# Patient Record
Sex: MALE | Race: ASIAN | NOT HISPANIC OR LATINO
[De-identification: names, ages, dates, MRNs, and addresses within clinical notes are randomized per-mention and may not be internally consistent; named-entity substitution may affect disease eponyms.]

---

## 2020-08-13 ENCOUNTER — APPOINTMENT (OUTPATIENT)
Dept: CARDIOLOGY | Facility: CLINIC | Age: 80
End: 2020-08-13
Payer: MEDICARE

## 2020-08-13 VITALS
BODY MASS INDEX: 19.36 KG/M2 | DIASTOLIC BLOOD PRESSURE: 78 MMHG | WEIGHT: 112 LBS | SYSTOLIC BLOOD PRESSURE: 125 MMHG | HEART RATE: 110 BPM | HEIGHT: 63.78 IN | RESPIRATION RATE: 17 BRPM | TEMPERATURE: 98.2 F | OXYGEN SATURATION: 96 %

## 2020-08-13 DIAGNOSIS — K21.9 GASTRO-ESOPHAGEAL REFLUX DISEASE W/OUT ESOPHAGITIS: ICD-10-CM

## 2020-08-13 DIAGNOSIS — E78.5 HYPERLIPIDEMIA, UNSPECIFIED: ICD-10-CM

## 2020-08-13 DIAGNOSIS — R00.2 PALPITATIONS: ICD-10-CM

## 2020-08-13 DIAGNOSIS — Z87.891 PERSONAL HISTORY OF NICOTINE DEPENDENCE: ICD-10-CM

## 2020-08-13 DIAGNOSIS — J43.9 EMPHYSEMA, UNSPECIFIED: ICD-10-CM

## 2020-08-13 DIAGNOSIS — N40.0 BENIGN PROSTATIC HYPERPLASIA WITHOUT LOWER URINARY TRACT SYMPMS: ICD-10-CM

## 2020-08-13 DIAGNOSIS — R07.9 CHEST PAIN, UNSPECIFIED: ICD-10-CM

## 2020-08-13 PROBLEM — Z00.00 ENCOUNTER FOR PREVENTIVE HEALTH EXAMINATION: Status: ACTIVE | Noted: 2020-08-13

## 2020-08-13 PROCEDURE — 93015 CV STRESS TEST SUPVJ I&R: CPT

## 2020-08-13 PROCEDURE — 93306 TTE W/DOPPLER COMPLETE: CPT

## 2020-08-13 PROCEDURE — 99204 OFFICE O/P NEW MOD 45 MIN: CPT | Mod: 25

## 2020-08-19 ENCOUNTER — NON-APPOINTMENT (OUTPATIENT)
Age: 80
End: 2020-08-19

## 2020-08-20 ENCOUNTER — APPOINTMENT (OUTPATIENT)
Dept: CARDIOLOGY | Facility: CLINIC | Age: 80
End: 2020-08-20
Payer: MEDICARE

## 2020-08-20 VITALS
RESPIRATION RATE: 18 BRPM | WEIGHT: 115 LBS | TEMPERATURE: 97.9 F | HEART RATE: 68 BPM | OXYGEN SATURATION: 96 % | DIASTOLIC BLOOD PRESSURE: 72 MMHG | BODY MASS INDEX: 19.88 KG/M2 | SYSTOLIC BLOOD PRESSURE: 122 MMHG

## 2020-08-20 PROCEDURE — 99213 OFFICE O/P EST LOW 20 MIN: CPT

## 2020-08-24 PROBLEM — Z87.891 FORMER SMOKER, STOPPED SMOKING IN DISTANT PAST: Status: ACTIVE | Noted: 2020-08-24

## 2020-08-28 PROBLEM — J43.9 EMPHYSEMA/COPD: Status: ACTIVE | Noted: 2020-08-28

## 2020-08-28 PROBLEM — K21.9 ESOPHAGEAL REFLUX: Status: ACTIVE | Noted: 2020-08-28

## 2020-08-28 PROBLEM — N40.0 BPH (BENIGN PROSTATIC HYPERPLASIA): Status: ACTIVE | Noted: 2020-08-28

## 2020-08-28 PROBLEM — E78.5 HLD (HYPERLIPIDEMIA): Status: ACTIVE | Noted: 2020-08-28

## 2020-08-28 NOTE — PHYSICAL EXAM
[General Appearance - Well Developed] : well developed [Normal Appearance] : normal appearance [Well Groomed] : well groomed [General Appearance - Well Nourished] : well nourished [Normal Conjunctiva] : the conjunctiva exhibited no abnormalities [No Deformities] : no deformities [General Appearance - In No Acute Distress] : no acute distress [Normal Oral Mucosa] : normal oral mucosa [Eyelids - No Xanthelasma] : the eyelids demonstrated no xanthelasmas [Normal Jugular Venous V Waves Present] : normal jugular venous V waves present [No Oral Pallor] : no oral pallor [Normal Jugular Venous A Waves Present] : normal jugular venous A waves present [No Oral Cyanosis] : no oral cyanosis [Heart Rate And Rhythm] : heart rate and rhythm were normal [No Jugular Venous Cuellar A Waves] : no jugular venous cuellar A waves [Murmurs] : no murmurs present [Heart Sounds] : normal S1 and S2 [Exaggerated Use Of Accessory Muscles For Inspiration] : no accessory muscle use [Respiration, Rhythm And Depth] : normal respiratory rhythm and effort [Abdomen Soft] : soft [Auscultation Breath Sounds / Voice Sounds] : lungs were clear to auscultation bilaterally [Abdomen Tenderness] : non-tender [Abdomen Mass (___ Cm)] : no abdominal mass palpated [Abnormal Walk] : normal gait [Gait - Sufficient For Exercise Testing] : the gait was sufficient for exercise testing [Cyanosis, Localized] : no localized cyanosis [] : no ischemic changes [Nail Clubbing] : no clubbing of the fingernails [Petechial Hemorrhages (___cm)] : no petechial hemorrhages [Affect] : the affect was normal [Oriented To Time, Place, And Person] : oriented to person, place, and time [No Anxiety] : not feeling anxious [Mood] : the mood was normal [Arterial Pulses Normal] : the arterial pulses were normal [Edema] : no peripheral edema present

## 2020-08-28 NOTE — HISTORY OF PRESENT ILLNESS
[FreeTextEntry1] : 80 year-old male wit HLD, BPH, COPD presents for evaluation of CP.  Patient reports lower CP and palpitations for past week, not related to exertion, lasting 30 minutes.  Patient denies SOB.   Patient denies h/o syncope.  Patient reports occasional lightheadedness lasting seconds.   I advised patient to undergo an echocardiogram and a treadmill stress test.  PAF was noted during stress testing.  I advised patient to start Eliquis 2.5 mg BID, Metoprolol ER 25 mg, and Multaq 400 mg BID.  Followup 1 week.

## 2020-09-11 ENCOUNTER — APPOINTMENT (OUTPATIENT)
Dept: CARDIOLOGY | Facility: CLINIC | Age: 80
End: 2020-09-11
Payer: MEDICARE

## 2020-09-11 VITALS
HEART RATE: 69 BPM | OXYGEN SATURATION: 93 % | DIASTOLIC BLOOD PRESSURE: 72 MMHG | RESPIRATION RATE: 18 BRPM | TEMPERATURE: 97.8 F | WEIGHT: 112 LBS | SYSTOLIC BLOOD PRESSURE: 135 MMHG | BODY MASS INDEX: 19.36 KG/M2

## 2020-09-11 PROCEDURE — 99213 OFFICE O/P EST LOW 20 MIN: CPT

## 2020-09-11 RX ORDER — ASPIRIN ENTERIC COATED TABLETS 81 MG 81 MG/1
81 TABLET, DELAYED RELEASE ORAL
Refills: 0 | Status: DISCONTINUED | COMMUNITY
End: 2020-09-11

## 2020-09-11 RX ORDER — OMEPRAZOLE 20 MG/1
20 CAPSULE, DELAYED RELEASE ORAL
Qty: 30 | Refills: 1 | Status: ACTIVE | COMMUNITY
Start: 2020-09-11 | End: 1900-01-01

## 2020-09-11 RX ORDER — FAMOTIDINE 20 MG/1
20 TABLET, FILM COATED ORAL
Qty: 30 | Refills: 5 | Status: DISCONTINUED | COMMUNITY
Start: 2020-09-11 | End: 2020-09-11

## 2020-11-14 NOTE — PHYSICAL EXAM
[General Appearance - Well Developed] : well developed [Normal Appearance] : normal appearance [Well Groomed] : well groomed [General Appearance - Well Nourished] : well nourished [No Deformities] : no deformities [General Appearance - In No Acute Distress] : no acute distress [Normal Conjunctiva] : the conjunctiva exhibited no abnormalities [Eyelids - No Xanthelasma] : the eyelids demonstrated no xanthelasmas [Normal Oral Mucosa] : normal oral mucosa [No Oral Pallor] : no oral pallor [No Oral Cyanosis] : no oral cyanosis [Normal Jugular Venous A Waves Present] : normal jugular venous A waves present [Normal Jugular Venous V Waves Present] : normal jugular venous V waves present [No Jugular Venous Cuellar A Waves] : no jugular venous cuellar A waves [Respiration, Rhythm And Depth] : normal respiratory rhythm and effort [Exaggerated Use Of Accessory Muscles For Inspiration] : no accessory muscle use [Auscultation Breath Sounds / Voice Sounds] : lungs were clear to auscultation bilaterally [Heart Rate And Rhythm] : heart rate and rhythm were normal [Heart Sounds] : normal S1 and S2 [Murmurs] : no murmurs present [Abdomen Soft] : soft [Abdomen Tenderness] : non-tender [Abdomen Mass (___ Cm)] : no abdominal mass palpated [Abnormal Walk] : normal gait [Gait - Sufficient For Exercise Testing] : the gait was sufficient for exercise testing [Nail Clubbing] : no clubbing of the fingernails [Cyanosis, Localized] : no localized cyanosis [Petechial Hemorrhages (___cm)] : no petechial hemorrhages [] : no ischemic changes [Oriented To Time, Place, And Person] : oriented to person, place, and time [Affect] : the affect was normal [Mood] : the mood was normal [No Anxiety] : not feeling anxious [Arterial Pulses Normal] : the arterial pulses were normal [Edema] : no peripheral edema present

## 2020-11-14 NOTE — HISTORY OF PRESENT ILLNESS
[FreeTextEntry1] : 80 year-old male with PAF, HLD, BPH, COPD presents for followup.  \par \par Patient was last seen on 8/20/20.  I advised patient to continue Eliquis 2.5 mg BID Metoprolol ER 25 mg and Multaq 400 mg BID.\par \par Patient reports stomach discomfort, burning sensation.  He is not taking stomach medications.  Patient denies CP, SOB, palpitations, or lightheadedness.  I advised patient to have a trial of Omeprazole.  Patient denies diarrhea.  I advised patient to continue on all other medications. FU in 2 months. \par

## 2020-11-14 NOTE — DISCUSSION/SUMMARY
[FreeTextEntry1] : 80 year-old male with PAF, HLD, BPH, COPD presents for followup.  \par \par Patient was last seen on 8/20/20.  I advised patient to continue Eliquis 2.5 mg BID Metoprolol ER 25 mg and Multaq 400 mg BID.\par \par Patient reports stomach discomfort, burning sensation.  He is not taking stomach medications.  Patient denies CP, SOB, palpitations, or lightheadedness.  I advised patient to have a trial of Omeprazole.  Patient denies diarrhea.  I advised patient to continue on all other medications. FU in 2 months. \par \par (1) Stomach discomfort - I advised patient to try Omeprazole 20 mg.\par \par (2) PAF - Patient has been stable.  I advised patient to continue Eliquis 2.5 mg BID Metoprolol ER 25 mg and Multaq 400 mg BID.  His [id number] score is 2 (age>75).\par \par (3) Followup - 2 months.

## 2020-11-14 NOTE — REASON FOR VISIT
[Follow-Up - Clinic] : a clinic follow-up of [Atrial Fibrillation] : atrial fibrillation [Palpitations] : palpitations [FreeTextEntry1] : .

## 2020-11-14 NOTE — PHYSICAL EXAM
[General Appearance - Well Developed] : well developed [Normal Appearance] : normal appearance [Well Groomed] : well groomed [General Appearance - Well Nourished] : well nourished [No Deformities] : no deformities [General Appearance - In No Acute Distress] : no acute distress [Normal Conjunctiva] : the conjunctiva exhibited no abnormalities [Eyelids - No Xanthelasma] : the eyelids demonstrated no xanthelasmas [Normal Oral Mucosa] : normal oral mucosa [No Oral Pallor] : no oral pallor [No Oral Cyanosis] : no oral cyanosis [Normal Jugular Venous A Waves Present] : normal jugular venous A waves present [Normal Jugular Venous V Waves Present] : normal jugular venous V waves present [No Jugular Venous Cuellar A Waves] : no jugular venous cuellar A waves [Respiration, Rhythm And Depth] : normal respiratory rhythm and effort [Exaggerated Use Of Accessory Muscles For Inspiration] : no accessory muscle use [Auscultation Breath Sounds / Voice Sounds] : lungs were clear to auscultation bilaterally [Heart Rate And Rhythm] : heart rate and rhythm were normal [Murmurs] : no murmurs present [Heart Sounds] : normal S1 and S2 [Abdomen Soft] : soft [Abdomen Tenderness] : non-tender [Abdomen Mass (___ Cm)] : no abdominal mass palpated [Abnormal Walk] : normal gait [Gait - Sufficient For Exercise Testing] : the gait was sufficient for exercise testing [Nail Clubbing] : no clubbing of the fingernails [Cyanosis, Localized] : no localized cyanosis [Petechial Hemorrhages (___cm)] : no petechial hemorrhages [] : no ischemic changes [Oriented To Time, Place, And Person] : oriented to person, place, and time [Affect] : the affect was normal [Mood] : the mood was normal [No Anxiety] : not feeling anxious [Arterial Pulses Normal] : the arterial pulses were normal [Edema] : no peripheral edema present

## 2020-11-14 NOTE — PHYSICAL EXAM
[General Appearance - Well Developed] : well developed [Normal Appearance] : normal appearance [Well Groomed] : well groomed [General Appearance - Well Nourished] : well nourished [No Deformities] : no deformities [General Appearance - In No Acute Distress] : no acute distress [Normal Conjunctiva] : the conjunctiva exhibited no abnormalities [Eyelids - No Xanthelasma] : the eyelids demonstrated no xanthelasmas [Normal Oral Mucosa] : normal oral mucosa [No Oral Pallor] : no oral pallor [No Oral Cyanosis] : no oral cyanosis [Normal Jugular Venous A Waves Present] : normal jugular venous A waves present [Normal Jugular Venous V Waves Present] : normal jugular venous V waves present [No Jugular Venous Cuellar A Waves] : no jugular venous cuellar A waves [Respiration, Rhythm And Depth] : normal respiratory rhythm and effort [Exaggerated Use Of Accessory Muscles For Inspiration] : no accessory muscle use [Auscultation Breath Sounds / Voice Sounds] : lungs were clear to auscultation bilaterally [Heart Rate And Rhythm] : heart rate and rhythm were normal [Heart Sounds] : normal S1 and S2 [Murmurs] : no murmurs present [Arterial Pulses Normal] : the arterial pulses were normal [Edema] : no peripheral edema present [Abdomen Soft] : soft [Abdomen Tenderness] : non-tender [Abdomen Mass (___ Cm)] : no abdominal mass palpated [Abnormal Walk] : normal gait [Gait - Sufficient For Exercise Testing] : the gait was sufficient for exercise testing [Nail Clubbing] : no clubbing of the fingernails [Cyanosis, Localized] : no localized cyanosis [Petechial Hemorrhages (___cm)] : no petechial hemorrhages [] : no ischemic changes [Oriented To Time, Place, And Person] : oriented to person, place, and time [Affect] : the affect was normal [Mood] : the mood was normal [No Anxiety] : not feeling anxious

## 2020-11-14 NOTE — HISTORY OF PRESENT ILLNESS
[FreeTextEntry1] : 80 year-old male wit PAF, HLD, BPH, COPD presents for followup.  \par \par Patient was last seen on 8/13/20 for evaluation of CP.   Patient underwent an echocardiogram and it showed normal LV function without significant valvular pathology. Patient underwent a treadmill stress test and completed 4 minutes of Ruperto protocol. There was no ECG evidence of ischemia.  Patient developed rapid AFIB during the study.  I advised patient to start Eliquis 2.5 mg BID, Metoprolol ER 25 mg, and Multaq 400 mg BID\par \par Patient reports feeling better on medications. He has occasional CP.  Patient denies SOB. Patient denies palpitations.  He denies bleeding issues. FU in one month.

## 2020-11-16 ENCOUNTER — APPOINTMENT (OUTPATIENT)
Dept: CARDIOLOGY | Facility: CLINIC | Age: 80
End: 2020-11-16
Payer: MEDICARE

## 2020-11-16 VITALS
DIASTOLIC BLOOD PRESSURE: 82 MMHG | TEMPERATURE: 98 F | RESPIRATION RATE: 18 BRPM | BODY MASS INDEX: 19.53 KG/M2 | HEART RATE: 110 BPM | WEIGHT: 113 LBS | OXYGEN SATURATION: 95 % | SYSTOLIC BLOOD PRESSURE: 145 MMHG

## 2020-11-16 DIAGNOSIS — R10.13 EPIGASTRIC PAIN: ICD-10-CM

## 2020-11-16 PROCEDURE — 99213 OFFICE O/P EST LOW 20 MIN: CPT

## 2020-11-16 PROCEDURE — 99072 ADDL SUPL MATRL&STAF TM PHE: CPT

## 2020-11-24 NOTE — DISCUSSION/SUMMARY
[FreeTextEntry1] : 80 year-old male with PAF, HLD, BPH, COPD presents for followup.  \par \par Patient was last seen on 8/20/20.  I advised patient to continue Eliquis 2.5 mg BID Metoprolol ER 25 mg and Multaq 400 mg BID.\par \par Patient reports stomach discomfort, burning sensation.  He is not taking stomach medications.  Patient denies CP, SOB, palpitations, or lightheadedness.  I advised patient to have a trial of Omeprazole.  Patient denies diarrhea.  I advised patient to continue on all other medications. FU in 2 months. \par \par (1) Stomach discomfort - I advised patient to try Omeprazole 20 mg.\par \par (2) PAF - Patient has been stable.  I advised patient to continue Eliquis 2.5 mg BID Metoprolol ER 25 mg and Multaq 400 mg BID.  His [id number] score is 2 (age>75).\par \par (3) Followup - 2 months.

## 2020-11-24 NOTE — HISTORY OF PRESENT ILLNESS
[FreeTextEntry1] : 80 year-old male with PAF, HLD, BPH, COPD presents for followup.  \par \par Patient was last seen on 9/11/20.  He reported stomach discomfort.  I advised patient to try Omeprazole 20 mg.  I advised patient to continue Eliquis 2.5 mg BID Metoprolol ER 25 mg and Multaq 400 mg BID.

## 2020-11-24 NOTE — REASON FOR VISIT
[Follow-Up - Clinic] : a clinic follow-up of [Atrial Fibrillation] : atrial fibrillation [FreeTextEntry1] : 11/16/20- Patient reports epigastric discomfort. His HR is elevated because he ran out of Metoprolol ER 25 mg for one week. He reports stomach medicine had helped in the past and he will take the stomach medicine again. I advised patient to restart on Metoprolol right away and to call anytime he is out of medications. \par \par \par \par 9/11/20 - Patient reports stomach discomfort, burning sensation.  He is not taking stomach medications.  Patient denies CP, SOB, palpitations, or lightheadedness.  I advised patient to have a trial of Omeprazole.  Patient denies diarrhea.  I advised patient to continue on all other medications. FU in 2 months. \par

## 2021-02-13 NOTE — PHYSICAL EXAM
[General Appearance - Well Developed] : well developed [Normal Appearance] : normal appearance [Well Groomed] : well groomed [General Appearance - Well Nourished] : well nourished [No Deformities] : no deformities [General Appearance - In No Acute Distress] : no acute distress [Normal Conjunctiva] : the conjunctiva exhibited no abnormalities [Eyelids - No Xanthelasma] : the eyelids demonstrated no xanthelasmas [Normal Oral Mucosa] : normal oral mucosa [No Oral Pallor] : no oral pallor [No Oral Cyanosis] : no oral cyanosis [Normal Jugular Venous A Waves Present] : normal jugular venous A waves present [Normal Jugular Venous V Waves Present] : normal jugular venous V waves present [No Jugular Venous Cuellar A Waves] : no jugular venous cuellar A waves [Respiration, Rhythm And Depth] : normal respiratory rhythm and effort [Auscultation Breath Sounds / Voice Sounds] : lungs were clear to auscultation bilaterally [Exaggerated Use Of Accessory Muscles For Inspiration] : no accessory muscle use [Heart Rate And Rhythm] : heart rate and rhythm were normal [Heart Sounds] : normal S1 and S2 [Murmurs] : no murmurs present [Edema] : no peripheral edema present [Arterial Pulses Normal] : the arterial pulses were normal [Abdomen Soft] : soft [Abdomen Tenderness] : non-tender [Abdomen Mass (___ Cm)] : no abdominal mass palpated [Abnormal Walk] : normal gait [Gait - Sufficient For Exercise Testing] : the gait was sufficient for exercise testing [Nail Clubbing] : no clubbing of the fingernails [Cyanosis, Localized] : no localized cyanosis [] : no ischemic changes [Petechial Hemorrhages (___cm)] : no petechial hemorrhages [Oriented To Time, Place, And Person] : oriented to person, place, and time [Mood] : the mood was normal [Affect] : the affect was normal [No Anxiety] : not feeling anxious

## 2021-02-16 ENCOUNTER — NON-APPOINTMENT (OUTPATIENT)
Age: 81
End: 2021-02-16

## 2021-02-16 ENCOUNTER — APPOINTMENT (OUTPATIENT)
Dept: CARDIOLOGY | Facility: CLINIC | Age: 81
End: 2021-02-16
Payer: MEDICARE

## 2021-02-16 VITALS
WEIGHT: 108 LBS | HEART RATE: 80 BPM | DIASTOLIC BLOOD PRESSURE: 73 MMHG | BODY MASS INDEX: 18.67 KG/M2 | SYSTOLIC BLOOD PRESSURE: 121 MMHG | TEMPERATURE: 97.5 F | OXYGEN SATURATION: 94 % | RESPIRATION RATE: 18 BRPM

## 2021-02-16 PROCEDURE — 99072 ADDL SUPL MATRL&STAF TM PHE: CPT

## 2021-02-16 PROCEDURE — 99213 OFFICE O/P EST LOW 20 MIN: CPT

## 2021-03-12 NOTE — HISTORY OF PRESENT ILLNESS
[FreeTextEntry1] : 80 year-old male with PAF, HLD, BPH, COPD presents for followup.  \par \par Patient was last seen on 11/16/20 for fast HR.  I advised patient to resume Metoprolol ER 25 mg. I advised patient to continue Eliquis 2.5 mg BID and Multaq 400 mg BID.\par \par Last echo was on 8/13/20 and it showed normal LV function with moderate TR.

## 2021-03-12 NOTE — DISCUSSION/SUMMARY
[FreeTextEntry1] : 80 year-old male with PAF, HLD, BPH, COPD presents for followup.  \par \par Patient was last seen on 8/20/20.  I advised patient to continue Eliquis 2.5 mg BID Metoprolol ER 25 mg and Multaq 400 mg BID.\par \par Patient reports stomach discomfort, burning sensation.  He is not taking stomach medications.  Patient denies CP, SOB, palpitations, or lightheadedness.  I advised patient to have a trial of Omeprazole.  Patient denies diarrhea.  I advised patient to continue on all other medications. FU in 2 months. \par \par (1) Stomach discomfort - I advised patient to try Omeprazole 20 mg.\par \par (2) PAF - Patient has been stable.  I advised patient to continue Eliquis 2.5 mg BID Metoprolol ER 25 mg and Multaq 400 mg BID.  His [id number] score is 2 (age>75).\par \par (3) Followup - 2 months.

## 2021-03-12 NOTE — REASON FOR VISIT
[Follow-Up - Clinic] : a clinic follow-up of [Atrial Fibrillation] : atrial fibrillation [FreeTextEntry1] : 2/16/21 - Patient reports feeling well. Patient denies CP, SOB, palpitations, or lightheadedness. He is out of medications. FU in 6 months. \par \par \par 11/16/20 - Patient reports epigastric discomfort. His HR is elevated because he ran out of Metoprolol ER 25 mg for one week. He reports stomach medicine had helped in the past and he will take the stomach medicine again. I advised patient to restart on Metoprolol right away and to call anytime he is out of medications. \par \par 9/11/20 - Patient reports stomach discomfort, burning sensation.  He is not taking stomach medications.  Patient denies CP, SOB, palpitations, or lightheadedness.  I advised patient to have a trial of Omeprazole.  Patient denies diarrhea.  I advised patient to continue on all other medications. FU in 2 months. \par

## 2021-07-09 ENCOUNTER — NON-APPOINTMENT (OUTPATIENT)
Age: 81
End: 2021-07-09

## 2021-07-21 ENCOUNTER — APPOINTMENT (OUTPATIENT)
Dept: CARDIOLOGY | Facility: CLINIC | Age: 81
End: 2021-07-21
Payer: MEDICARE

## 2021-07-21 VITALS
BODY MASS INDEX: 19.53 KG/M2 | OXYGEN SATURATION: 95 % | TEMPERATURE: 97.6 F | WEIGHT: 113 LBS | DIASTOLIC BLOOD PRESSURE: 77 MMHG | HEART RATE: 66 BPM | RESPIRATION RATE: 18 BRPM | SYSTOLIC BLOOD PRESSURE: 128 MMHG

## 2021-07-21 PROCEDURE — 99213 OFFICE O/P EST LOW 20 MIN: CPT

## 2022-01-17 NOTE — REASON FOR VISIT
[Symptom and Test Evaluation] : symptom and test evaluation [FreeTextEntry1] : 80 year-old male with PAF, HLD, BPH, COPD presents for followup.  \par \par Patient was last seen on 2/16/21.\par \par He is on Eliquis 2.5 mg BID for stroke prevention.  He is on Metoprolol ER 25 mg and Multaq 400 mg BID for AFIB suppression.\par \par Last echo was on 8/13/20 and it showed normal LV function with moderate TR.\par \par

## 2022-01-17 NOTE — HISTORY OF PRESENT ILLNESS
[FreeTextEntry1] : 7/21/21 - Patient has been stable.  Patient denies CP or SOB.  Patient reports rare palpitations.  Patient denies lightheadedness.  Patient denies h/o syncope.  FU 6 months. \par \par 2/16/21 - Patient reports feeling well. Patient denies CP, SOB, palpitations, or lightheadedness. He is out of medications. FU in 6 months. \par \par 11/16/20 - Patient reports epigastric discomfort. His HR is elevated because he ran out of Metoprolol ER 25 mg for one week. He reports stomach medicine had helped in the past and he will take the stomach medicine again. I advised patient to restart on Metoprolol right away and to call anytime he is out of medications. \par \par 9/11/20 - Patient reports stomach discomfort, burning sensation.  He is not taking stomach medications.  Patient denies CP, SOB, palpitations, or lightheadedness.  I advised patient to have a trial of Omeprazole.  Patient denies diarrhea.  I advised patient to continue on all other medications. FU in 2 months. \par

## 2022-01-19 ENCOUNTER — APPOINTMENT (OUTPATIENT)
Dept: CARDIOLOGY | Facility: CLINIC | Age: 82
End: 2022-01-19
Payer: MEDICARE

## 2022-01-25 ENCOUNTER — APPOINTMENT (OUTPATIENT)
Dept: CARDIOLOGY | Facility: CLINIC | Age: 82
End: 2022-01-25
Payer: MEDICARE

## 2022-01-25 VITALS
RESPIRATION RATE: 18 BRPM | BODY MASS INDEX: 19.7 KG/M2 | WEIGHT: 114 LBS | TEMPERATURE: 98.2 F | OXYGEN SATURATION: 95 % | HEART RATE: 67 BPM | SYSTOLIC BLOOD PRESSURE: 155 MMHG | DIASTOLIC BLOOD PRESSURE: 70 MMHG

## 2022-01-25 PROCEDURE — 99214 OFFICE O/P EST MOD 30 MIN: CPT

## 2022-02-08 ENCOUNTER — APPOINTMENT (OUTPATIENT)
Dept: CARDIOLOGY | Facility: CLINIC | Age: 82
End: 2022-02-08
Payer: MEDICARE

## 2022-02-08 VITALS — DIASTOLIC BLOOD PRESSURE: 65 MMHG | TEMPERATURE: 97.8 F | SYSTOLIC BLOOD PRESSURE: 125 MMHG

## 2022-02-08 PROCEDURE — 93306 TTE W/DOPPLER COMPLETE: CPT

## 2022-08-07 NOTE — REASON FOR VISIT
[FreeTextEntry1] : 81 year-old male with PAF, HLD, BPH, COPD presents for followup.  \par \par Patient was last seen on 7/21/21.\par \par He is on Eliquis 2.5 mg BID for stroke prevention.  He is on Metoprolol ER 25 mg and Multaq 400 mg BID for AFIB suppression.\par \par Last echo was on 8/13/20 and it showed normal LV function with moderate TR.\par \par

## 2022-08-07 NOTE — HISTORY OF PRESENT ILLNESS
[FreeTextEntry1] : 1/25/22 - Patient reports occasional PALENCIA.  Patient denies CP or SOB.  Patient reports occasional LE weakness.  Patient denies bleeding with Eliquis.  He has rales at lung bases.  I advised patient to undergo an echocardiogram.  I will obtain insurance authorization (SOB, PAF).\par \par 7/21/21 - Patient has been stable.  Patient denies CP or SOB.  Patient reports rare palpitations.  Patient denies lightheadedness.  Patient denies h/o syncope.  FU 6 months. \par \par 2/16/21 - Patient reports feeling well. Patient denies CP, SOB, palpitations, or lightheadedness. He is out of medications. FU in 6 months. \par \par 11/16/20 - Patient reports epigastric discomfort. His HR is elevated because he ran out of Metoprolol ER 25 mg for one week. He reports stomach medicine had helped in the past and he will take the stomach medicine again. I advised patient to restart on Metoprolol right away and to call anytime he is out of medications. \par \par 9/11/20 - Patient reports stomach discomfort, burning sensation.  He is not taking stomach medications.  Patient denies CP, SOB, palpitations, or lightheadedness.  I advised patient to have a trial of Omeprazole.  Patient denies diarrhea.  I advised patient to continue on all other medications. FU in 2 months. \par

## 2022-08-08 NOTE — REASON FOR VISIT
[FreeTextEntry1] : 81 year-old male with PAF, HLD, BPH, COPD presents for followup.  \par \par Patient was last seen on 1/25/22 for followup.  Patient reported occasional  PALENCIA.   I advised patient to undergo an echocardiogram.  Patient underwent an echocardiogram and it showed normal LV function without significant valvular pathology. \par \par He is on Eliquis 2.5 mg BID for stroke prevention.  He is on Metoprolol ER 25 mg and Multaq 400 mg BID for AFIB suppression.\par \par Patient underwent an echocardiogram 2/8/22 and it showed normal LV function without significant valvular pathology.

## 2022-08-09 ENCOUNTER — APPOINTMENT (OUTPATIENT)
Dept: CARDIOLOGY | Facility: CLINIC | Age: 82
End: 2022-08-09

## 2022-08-09 ENCOUNTER — NON-APPOINTMENT (OUTPATIENT)
Age: 82
End: 2022-08-09

## 2022-08-09 VITALS
SYSTOLIC BLOOD PRESSURE: 104 MMHG | DIASTOLIC BLOOD PRESSURE: 63 MMHG | OXYGEN SATURATION: 95 % | BODY MASS INDEX: 19.53 KG/M2 | HEART RATE: 74 BPM | WEIGHT: 113 LBS | RESPIRATION RATE: 18 BRPM | TEMPERATURE: 98 F

## 2022-08-09 PROCEDURE — 93000 ELECTROCARDIOGRAM COMPLETE: CPT

## 2022-08-09 PROCEDURE — 99213 OFFICE O/P EST LOW 20 MIN: CPT

## 2022-10-07 NOTE — DISCUSSION/SUMMARY
See initial evaluation in plan of care.     
[FreeTextEntry1] : 80 year-old male wit PAF, HLD, BPH, COPD presents for followup.  \par \par Patient was last seen on 8/13/20 for evaluation of CP.   Patient underwent an echocardiogram and it showed normal LV function without significant valvular pathology. Patient underwent a treadmill stress test and completed 4 minutes of Ruperto protocol. There was no ECG evidence of ischemia.  Patient developed rapid AFIB during the study.  I advised patient to start Eliquis 2.5 mg BID, Metoprolol ER 25 mg, and Multaq 400 mg BID\par \par Patient reports feeling better on medications. He has occasional CP.  Patient denies SOB. Patient denies palpitations.  He denies bleeding issues. FU in one month.\par \par (1) PAF - Patient is feeling better.  I advised patient to continue Eliquis 2.5 mg BID, Metoprolol ER 25 mg, and Multaq 400 mg BID.  His [id number] score is 2 (age>75).\par \par (2) Followup - 1 month.
Yes

## 2022-12-16 NOTE — DISCUSSION/SUMMARY
[FreeTextEntry1] : 80 year-old male wit HLD, BPH, COPD presents for evaluation of CP.  Patient reports lower CP and palpitations for past week, not related to exertion, lasting 30 minutes.  Patient denies SOB.   Patient denies h/o syncope.  Patient reports occasional lightheadedness lasting seconds.   I advised patient to undergo an echocardiogram and a treadmill stress test.  PAF was noted during stress testing.  I advised patient to start Eliquis 2.5 mg BID, Metoprolol ER 25 mg, and Multaq 400 mg BID.  Followup 1 week. \par \par (1) CP, palpitations - Patient underwent an echocardiogram and it showed normal LV function without significant valvular pathology.  Patient underwent a treadmill stress test and completed 4 minutes of Ruperto protocol.  There was no ECG evidence of ischemia. Patient appears to be at low risk for having significant CAD.  Patient developed rapid AFIB during the study.  I advised patient to start Eliquis 2.5 mg BID, Metoprolol ER 25 mg, and Multaq 400 mg BID.  His [id number] score is 2 (age>75).\par \par (2) Followup - 1 week.
Statement Selected

## 2023-02-06 NOTE — PHYSICAL EXAM
[General Appearance - Well Developed] : well developed [Normal Appearance] : normal appearance [Well Groomed] : well groomed [No Deformities] : no deformities [General Appearance - Well Nourished] : well nourished [General Appearance - In No Acute Distress] : no acute distress [Normal Conjunctiva] : the conjunctiva exhibited no abnormalities [Eyelids - No Xanthelasma] : the eyelids demonstrated no xanthelasmas [Normal Oral Mucosa] : normal oral mucosa [No Oral Pallor] : no oral pallor [No Oral Cyanosis] : no oral cyanosis [Normal Jugular Venous A Waves Present] : normal jugular venous A waves present [Normal Jugular Venous V Waves Present] : normal jugular venous V waves present [No Jugular Venous Cuellar A Waves] : no jugular venous cuellar A waves [Respiration, Rhythm And Depth] : normal respiratory rhythm and effort [Exaggerated Use Of Accessory Muscles For Inspiration] : no accessory muscle use [Auscultation Breath Sounds / Voice Sounds] : lungs were clear to auscultation bilaterally [Heart Rate And Rhythm] : heart rate and rhythm were normal [Heart Sounds] : normal S1 and S2 [Murmurs] : no murmurs present [Arterial Pulses Normal] : the arterial pulses were normal [Edema] : no peripheral edema present [Abdomen Tenderness] : non-tender [Abdomen Soft] : soft [Abdomen Mass (___ Cm)] : no abdominal mass palpated [Abnormal Walk] : normal gait [Gait - Sufficient For Exercise Testing] : the gait was sufficient for exercise testing [Nail Clubbing] : no clubbing of the fingernails [Cyanosis, Localized] : no localized cyanosis [Petechial Hemorrhages (___cm)] : no petechial hemorrhages [] : no ischemic changes [Oriented To Time, Place, And Person] : oriented to person, place, and time [Affect] : the affect was normal [Mood] : the mood was normal [No Anxiety] : not feeling anxious

## 2023-02-07 ENCOUNTER — APPOINTMENT (OUTPATIENT)
Dept: CARDIOLOGY | Facility: CLINIC | Age: 83
End: 2023-02-07
Payer: MEDICARE

## 2023-02-07 ENCOUNTER — NON-APPOINTMENT (OUTPATIENT)
Age: 83
End: 2023-02-07

## 2023-02-07 VITALS
BODY MASS INDEX: 19.01 KG/M2 | HEART RATE: 77 BPM | RESPIRATION RATE: 18 BRPM | SYSTOLIC BLOOD PRESSURE: 119 MMHG | DIASTOLIC BLOOD PRESSURE: 70 MMHG | WEIGHT: 110 LBS | TEMPERATURE: 97.3 F | OXYGEN SATURATION: 96 %

## 2023-02-07 DIAGNOSIS — R06.02 SHORTNESS OF BREATH: ICD-10-CM

## 2023-02-07 PROCEDURE — 93306 TTE W/DOPPLER COMPLETE: CPT

## 2023-02-07 PROCEDURE — 93000 ELECTROCARDIOGRAM COMPLETE: CPT

## 2023-02-07 PROCEDURE — 99214 OFFICE O/P EST MOD 30 MIN: CPT

## 2023-02-22 NOTE — HISTORY OF PRESENT ILLNESS
[FreeTextEntry1] : 2/7/23 - Pt reports regular jogging for > 1 hr. Pt reports increased PALENCIA and leg weakness. Pt denies CP and palpitations. Pt is on Lipitor 20 mg, Eliquis 2.5 mg BID, Metoprolol ER 25 mg and Multaq 400 mg BID.  patient today for followup of PAF. Patient reports mild PALENCIA. Patient denies palpitations. I advised patient to undergo an echocardiogram. FU in 6 months. \par \par 8/9/22 - Patient has been stable.  Patient denies CP.  Patient reports his breathing is OK.  Patient denies palpitations.  Patient denies lightheadedness.  Patient reports leg weakness.  ECG normal.  Rales noted at bases.  He is on Eliquis 2.5 mg BID for stroke prevention.  He is on Metoprolol ER 25 mg and Multaq 400 mg BID for AFIB suppression. I advised patient to continue current medications.  FU 6 months. \par \par 1/25/22 - Patient reports occasional PALENCIA.  Patient denies CP or SOB.  Patient reports occasional LE weakness.  Patient denies bleeding with Eliquis.  He has rales at lung bases.  I advised patient to undergo an echocardiogram.  I will obtain insurance authorization (SOB, PAF).\par \par 7/21/21 - Patient has been stable.  Patient denies CP or SOB.  Patient reports rare palpitations.  Patient denies lightheadedness.  Patient denies h/o syncope.  FU 6 months. \par \par 2/16/21 - Patient reports feeling well. Patient denies CP, SOB, palpitations, or lightheadedness. He is out of medications. FU in 6 months. \par \par 11/16/20 - Patient reports epigastric discomfort. His HR is elevated because he ran out of Metoprolol ER 25 mg for one week. He reports stomach medicine had helped in the past and he will take the stomach medicine again. I advised patient to restart on Metoprolol right away and to call anytime he is out of medications. \par \par 9/11/20 - Patient reports stomach discomfort, burning sensation.  He is not taking stomach medications.  Patient denies CP, SOB, palpitations, or lightheadedness.  I advised patient to have a trial of Omeprazole.  Patient denies diarrhea.  I advised patient to continue on all other medications. FU in 2 months. \par

## 2023-02-22 NOTE — REASON FOR VISIT
[FreeTextEntry1] : 81 year-old male with PAF since 2020, HLD, BPH, COPD presents for followup.  \par \par Patient was last seen on 1/25/22 for followup.  Patient reported occasional  PALENCIA.   I advised patient to undergo an echocardiogram.  Patient underwent an echocardiogram and it showed normal LV function without significant valvular pathology. \par \par He is on Eliquis 2.5 mg BID for stroke prevention.  He is on Metoprolol ER 25 mg and Multaq 400 mg BID for AFIB suppression.\par \par Patient underwent an echocardiogram 2/8/22 and it showed normal LV function without significant valvular pathology.

## 2023-08-08 ENCOUNTER — APPOINTMENT (OUTPATIENT)
Dept: CARDIOLOGY | Facility: CLINIC | Age: 83
End: 2023-08-08
Payer: MEDICARE

## 2023-08-08 VITALS
WEIGHT: 104 LBS | BODY MASS INDEX: 17.98 KG/M2 | SYSTOLIC BLOOD PRESSURE: 123 MMHG | DIASTOLIC BLOOD PRESSURE: 69 MMHG | HEART RATE: 55 BPM | OXYGEN SATURATION: 95 % | TEMPERATURE: 96.8 F | RESPIRATION RATE: 16 BRPM

## 2023-08-08 DIAGNOSIS — M79.10 MYALGIA, UNSPECIFIED SITE: ICD-10-CM

## 2023-08-08 PROCEDURE — 99214 OFFICE O/P EST MOD 30 MIN: CPT

## 2023-08-08 RX ORDER — FAMOTIDINE 20 MG/1
20 TABLET, FILM COATED ORAL
Qty: 30 | Refills: 5 | Status: ACTIVE | COMMUNITY
Start: 2023-08-08 | End: 1900-01-01

## 2023-08-08 RX ORDER — UBIDECARENONE 100 MG
100 CAPSULE ORAL
Qty: 30 | Refills: 5 | Status: ACTIVE | COMMUNITY
Start: 2023-08-08 | End: 1900-01-01

## 2023-08-27 NOTE — REASON FOR VISIT
[FreeTextEntry1] : 81 year-old male with PAF since 2020, HLD, BPH, COPD, presents for followup.    Patient was last seen on 2/7/23 - Pt reports regular jogging for > 1 hr.  Pt reports increased PALENCIA and leg weakness. Pt denies CP and palpitations.  Pt is on Lipitor 20 mg, Eliquis 2.5 mg BID, Metoprolol ER 25 mg and Multaq 400 mg BID.  patient today for followup of PAF. Patient reports mild PALENCIA. Patient denies palpitations. I advised patient to undergo an echocardiogram. FU in 6 months.   He is on Eliquis 2.5 mg BID for stroke prevention.  He is on Metoprolol ER 25 mg and Multaq 400 mg BID for AFIB suppression.  Patient underwent an echocardiogram 2/8/22 and it showed normal LV function without significant valvular pathology.

## 2023-08-27 NOTE — HISTORY OF PRESENT ILLNESS
[FreeTextEntry1] : 8/8/23 -  Patient reports SOB. He reports leg weakness and epigastric tenderness.   2/7/23 - Pt reports regular jogging for > 1 hr. Pt reports increased PALENCIA and leg weakness. Pt denies CP and palpitations. Pt is on Lipitor 20 mg, Eliquis 2.5 mg BID, Metoprolol ER 25 mg and Multaq 400 mg BID.  patient today for followup of PAF. Patient reports mild PALENCIA. Patient denies palpitations. I advised patient to undergo an echocardiogram. FU in 6 months.   8/9/22 - Patient has been stable.  Patient denies CP.  Patient reports his breathing is OK.  Patient denies palpitations.  Patient denies lightheadedness.  Patient reports leg weakness.  ECG normal.  Rales noted at bases.  He is on Eliquis 2.5 mg BID for stroke prevention.  He is on Metoprolol ER 25 mg and Multaq 400 mg BID for AFIB suppression. I advised patient to continue current medications.  FU 6 months.   1/25/22 - Patient reports occasional PALENCIA.  Patient denies CP or SOB.  Patient reports occasional LE weakness.  Patient denies bleeding with Eliquis.  He has rales at lung bases.  I advised patient to undergo an echocardiogram.  I will obtain insurance authorization (SOB, PAF).  7/21/21 - Patient has been stable.  Patient denies CP or SOB.  Patient reports rare palpitations.  Patient denies lightheadedness.  Patient denies h/o syncope.  FU 6 months.   2/16/21 - Patient reports feeling well. Patient denies CP, SOB, palpitations, or lightheadedness. He is out of medications. FU in 6 months.   11/16/20 - Patient reports epigastric discomfort. His HR is elevated because he ran out of Metoprolol ER 25 mg for one week. He reports stomach medicine had helped in the past and he will take the stomach medicine again. I advised patient to restart on Metoprolol right away and to call anytime he is out of medications.   9/11/20 - Patient reports stomach discomfort, burning sensation.  He is not taking stomach medications.  Patient denies CP, SOB, palpitations, or lightheadedness.  I advised patient to have a trial of Omeprazole.  Patient denies diarrhea.  I advised patient to continue on all other medications. FU in 2 months.

## 2024-01-30 ENCOUNTER — APPOINTMENT (OUTPATIENT)
Dept: CARDIOLOGY | Facility: CLINIC | Age: 84
End: 2024-01-30

## 2024-02-06 ENCOUNTER — NON-APPOINTMENT (OUTPATIENT)
Age: 84
End: 2024-02-06

## 2024-02-06 ENCOUNTER — APPOINTMENT (OUTPATIENT)
Dept: CARDIOLOGY | Facility: CLINIC | Age: 84
End: 2024-02-06
Payer: MEDICARE

## 2024-02-06 ENCOUNTER — APPOINTMENT (OUTPATIENT)
Dept: CARDIOLOGY | Facility: CLINIC | Age: 84
End: 2024-02-06

## 2024-02-06 VITALS
SYSTOLIC BLOOD PRESSURE: 115 MMHG | RESPIRATION RATE: 16 BRPM | OXYGEN SATURATION: 95 % | HEART RATE: 86 BPM | BODY MASS INDEX: 18.15 KG/M2 | TEMPERATURE: 97.8 F | DIASTOLIC BLOOD PRESSURE: 70 MMHG | WEIGHT: 105 LBS

## 2024-02-06 DIAGNOSIS — I50.810 RIGHT HEART FAILURE, UNSPECIFIED: ICD-10-CM

## 2024-02-06 DIAGNOSIS — I48.0 PAROXYSMAL ATRIAL FIBRILLATION: ICD-10-CM

## 2024-02-06 PROCEDURE — 93000 ELECTROCARDIOGRAM COMPLETE: CPT

## 2024-02-06 PROCEDURE — 99214 OFFICE O/P EST MOD 30 MIN: CPT

## 2024-02-06 RX ORDER — DRONEDARONE 400 MG/1
400 TABLET, FILM COATED ORAL
Qty: 60 | Refills: 5 | Status: ACTIVE | COMMUNITY
Start: 2020-08-13 | End: 1900-01-01

## 2024-02-06 RX ORDER — METOPROLOL SUCCINATE 25 MG/1
25 TABLET, EXTENDED RELEASE ORAL DAILY
Qty: 30 | Refills: 5 | Status: ACTIVE | COMMUNITY
Start: 2020-08-13 | End: 1900-01-01

## 2024-02-06 RX ORDER — APIXABAN 2.5 MG/1
2.5 TABLET, FILM COATED ORAL
Qty: 60 | Refills: 5 | Status: ACTIVE | COMMUNITY
Start: 2020-08-13 | End: 1900-01-01

## 2024-02-25 NOTE — HISTORY OF PRESENT ILLNESS
[FreeTextEntry1] : 2/6/24 - Patient has been stable.  Patient denies CP, SOB, palpitations, or lightheadedness.  Patient denies bleeding.  He is on Eliquis 2.5 mg BID for stroke prevention.  He is on Metoprolol ER 25 mg and Multaq 400 mg BID for AFIB suppression.  /70 HR 86.  ECG showed no ischemic changes.  I advised patient to continue current medications.  I advised patient to undergo an echocardiogram.  Patient wished to defer until the next visit.  FU 6 months.   (1) PAF - Patient has been stable. I advised patient to continue Eliquis 2.5 mg BID, Metoprolol ER 25 mg, Multaq 400 mg BID.  His [id number] score is 2 (age>75).   (2) RV/RA enlargement, moderate TR -  I advised patient to undergo an echocardiogram.  Patient wished to defer until the next visit.     (3) Followup - 6 months.   8/8/23 -  Patient reports SOB.  He reports leg weakness and epigastric tenderness.  I advised patient to continue current medications.  FU 6 months.   2/7/23 - Pt reports regular jogging for > 1 hr. Pt reports increased PALENCIA and leg weakness. Pt denies CP and palpitations. Pt is on Lipitor 20 mg, Eliquis 2.5 mg BID, Metoprolol ER 25 mg and Multaq 400 mg BID.  patient today for followup of PAF. Patient reports mild PALENCIA. Patient denies palpitations. I advised patient to undergo an echocardiogram. FU in 6 months.  Patient underwent an echocardiogram and it showed normal LV function, mild RV enlargement, moderate RA enlargement, moderate TR.   8/9/22 - Patient has been stable.  Patient denies CP.  Patient reports his breathing is OK.  Patient denies palpitations.  Patient denies lightheadedness.  Patient reports leg weakness.  ECG normal.  Rales noted at bases.  He is on Eliquis 2.5 mg BID for stroke prevention.  He is on Metoprolol ER 25 mg and Multaq 400 mg BID for AFIB suppression. I advised patient to continue current medications.  FU 6 months.   1/25/22 - Patient reports occasional PALENCIA.  Patient denies CP or SOB.  Patient reports occasional LE weakness.  Patient denies bleeding with Eliquis.  He has rales at lung bases.  I advised patient to undergo an echocardiogram.  I will obtain insurance authorization (SOB, PAF).  7/21/21 - Patient has been stable.  Patient denies CP or SOB.  Patient reports rare palpitations.  Patient denies lightheadedness.  Patient denies h/o syncope.  FU 6 months.   2/16/21 - Patient reports feeling well. Patient denies CP, SOB, palpitations, or lightheadedness. He is out of medications. FU in 6 months.   11/16/20 - Patient reports epigastric discomfort. His HR is elevated because he ran out of Metoprolol ER 25 mg for one week. He reports stomach medicine had helped in the past and he will take the stomach medicine again. I advised patient to restart on Metoprolol right away and to call anytime he is out of medications.   9/11/20 - Patient reports stomach discomfort, burning sensation.  He is not taking stomach medications.  Patient denies CP, SOB, palpitations, or lightheadedness.  I advised patient to have a trial of Omeprazole.  Patient denies diarrhea.  I advised patient to continue on all other medications. FU in 2 months.

## 2024-02-25 NOTE — REASON FOR VISIT
[FreeTextEntry1] : 83 year-old male with PAF since 2020, RV enlargement, HLD, BPH, COPD, presents for followup.    Patient was last seen on 8/8/23 - Patient reports SOB.  He reports leg weakness and epigastric tenderness.   I advised patient to continue current medications.  FU 6 months.   He is on Eliquis 2.5 mg BID for stroke prevention.  He is on Metoprolol ER 25 mg, Multaq 400 mg BID, for AFIB suppression.  Patient underwent an echocardiogram 2/7/23 and it showed normal LV function, mild RV enlargement, moderate RA enlargement, moderate TR.   Patient underwent an echocardiogram 2/8/22 and it showed normal LV function without significant valvular pathology.

## 2024-08-06 ENCOUNTER — APPOINTMENT (OUTPATIENT)
Dept: CARDIOLOGY | Facility: CLINIC | Age: 84
End: 2024-08-06

## 2024-08-06 PROBLEM — L29.9 PRURITUS: Status: ACTIVE | Noted: 2024-08-06

## 2024-08-06 PROCEDURE — 99214 OFFICE O/P EST MOD 30 MIN: CPT

## 2024-08-06 NOTE — REASON FOR VISIT
[FreeTextEntry1] : 83 year-old male with PAF since 2020, RV enlargement, HLD, BPH, COPD, presents for followup.    Patient was last seen on 2/6/24 - Patient has been stable.  Patient denies CP, SOB, palpitations, or lightheadedness.  Patient denies bleeding.  He is on Eliquis 2.5 mg BID for stroke prevention.  He is on Metoprolol ER 25 mg and Multaq 400 mg BID for AFIB suppression.  /70 HR 86.  ECG showed no ischemic changes.  I advised patient to continue current medications.  I advised patient to undergo an echocardiogram.  Patient wished to defer until the next visit.  FU 6 months.   He is on Eliquis 2.5 mg BID for stroke prevention.  He is on Metoprolol ER 25 mg, Multaq 400 mg BID, for AFIB suppression.  Patient underwent an echocardiogram 2/7/23 and it showed normal LV function, mild RV enlargement, moderate RA enlargement, moderate TR.   Patient underwent an echocardiogram 2/8/22 and it showed normal LV function without significant valvular pathology.

## 2024-08-06 NOTE — HISTORY OF PRESENT ILLNESS
[FreeTextEntry1] : 8/6/24 - Patient has been stable.  Patient denies CP, SOB, palpitations, or lightheadedness.  He is on Eliquis 2.5 mg BID for stroke prevention.  He is on Metoprolol ER 25 mg and Multaq 400 mg BID for AFIB suppression.  /74 HR 79.  Exam unremarkable.   I advised patient to continue current medications.  I advised patient to undergo an echocardiogram.  Patient wished to defer until the next visit.  FU 6 months.   (1) PAF - Patient has been stable.  His [id number] score is 2 (age>75).  I advised patient to continue Eliquis 2.5 mg BID, Metoprolol ER 25 mg, Multaq 400 mg BID.    (2) RV/RA enlargement, moderate TR -  I advised patient to undergo an echocardiogram.  Patient wished to defer until the next visit.     (3) Followup - 6 months.   2/6/24 - Patient has been stable.  Patient denies CP, SOB, palpitations, or lightheadedness.  Patient denies bleeding.  He is on Eliquis 2.5 mg BID for stroke prevention.  He is on Metoprolol ER 25 mg and Multaq 400 mg BID for AFIB suppression.  /70 HR 86.  ECG showed no ischemic changes.  I advised patient to continue current medications.  I advised patient to undergo an echocardiogram.  Patient wished to defer until the next visit.  FU 6 months.   8/8/23 -  Patient reports SOB.  He reports leg weakness and epigastric tenderness.  I advised patient to continue current medications.  FU 6 months.   2/7/23 - Pt reports regular jogging for > 1 hr. Pt reports increased PALENCIA and leg weakness. Pt denies CP and palpitations. Pt is on Lipitor 20 mg, Eliquis 2.5 mg BID, Metoprolol ER 25 mg and Multaq 400 mg BID.  patient today for followup of PAF. Patient reports mild PALENCIA. Patient denies palpitations. I advised patient to undergo an echocardiogram. FU in 6 months.  Patient underwent an echocardiogram and it showed normal LV function, mild RV enlargement, moderate RA enlargement, moderate TR.   8/9/22 - Patient has been stable.  Patient denies CP.  Patient reports his breathing is OK.  Patient denies palpitations.  Patient denies lightheadedness.  Patient reports leg weakness.  ECG normal.  Rales noted at bases.  He is on Eliquis 2.5 mg BID for stroke prevention.  He is on Metoprolol ER 25 mg and Multaq 400 mg BID for AFIB suppression. I advised patient to continue current medications.  FU 6 months.   1/25/22 - Patient reports occasional PALENCIA.  Patient denies CP or SOB.  Patient reports occasional LE weakness.  Patient denies bleeding with Eliquis.  He has rales at lung bases.  I advised patient to undergo an echocardiogram.  I will obtain insurance authorization (SOB, PAF).  7/21/21 - Patient has been stable.  Patient denies CP or SOB.  Patient reports rare palpitations.  Patient denies lightheadedness.  Patient denies h/o syncope.  FU 6 months.   2/16/21 - Patient reports feeling well. Patient denies CP, SOB, palpitations, or lightheadedness. He is out of medications. FU in 6 months.   11/16/20 - Patient reports epigastric discomfort. His HR is elevated because he ran out of Metoprolol ER 25 mg for one week. He reports stomach medicine had helped in the past and he will take the stomach medicine again. I advised patient to restart on Metoprolol right away and to call anytime he is out of medications.   9/11/20 - Patient reports stomach discomfort, burning sensation.  He is not taking stomach medications.  Patient denies CP, SOB, palpitations, or lightheadedness.  I advised patient to have a trial of Omeprazole.  Patient denies diarrhea.  I advised patient to continue on all other medications. FU in 2 months.

## 2025-02-05 ENCOUNTER — NON-APPOINTMENT (OUTPATIENT)
Age: 85
End: 2025-02-05

## 2025-02-05 ENCOUNTER — APPOINTMENT (OUTPATIENT)
Dept: CARDIOLOGY | Facility: CLINIC | Age: 85
End: 2025-02-05

## 2025-02-05 ENCOUNTER — APPOINTMENT (OUTPATIENT)
Dept: CARDIOLOGY | Facility: CLINIC | Age: 85
End: 2025-02-05
Payer: MEDICARE

## 2025-02-05 VITALS
HEART RATE: 72 BPM | OXYGEN SATURATION: 94 % | RESPIRATION RATE: 18 BRPM | DIASTOLIC BLOOD PRESSURE: 64 MMHG | WEIGHT: 102 LBS | SYSTOLIC BLOOD PRESSURE: 127 MMHG | BODY MASS INDEX: 17.63 KG/M2

## 2025-02-05 DIAGNOSIS — I50.810 RIGHT HEART FAILURE, UNSPECIFIED: ICD-10-CM

## 2025-02-05 DIAGNOSIS — I48.0 PAROXYSMAL ATRIAL FIBRILLATION: ICD-10-CM

## 2025-02-05 PROCEDURE — 93000 ELECTROCARDIOGRAM COMPLETE: CPT

## 2025-02-05 PROCEDURE — 93306 TTE W/DOPPLER COMPLETE: CPT

## 2025-02-05 PROCEDURE — 99214 OFFICE O/P EST MOD 30 MIN: CPT | Mod: 25

## 2025-08-07 PROBLEM — I07.1 SEVERE TRICUSPID REGURGITATION: Status: ACTIVE | Noted: 2025-08-07

## 2025-08-08 ENCOUNTER — APPOINTMENT (OUTPATIENT)
Dept: CARDIOLOGY | Facility: CLINIC | Age: 85
End: 2025-08-08

## 2025-08-08 VITALS
WEIGHT: 99 LBS | BODY MASS INDEX: 17.11 KG/M2 | SYSTOLIC BLOOD PRESSURE: 107 MMHG | RESPIRATION RATE: 18 BRPM | HEART RATE: 72 BPM | DIASTOLIC BLOOD PRESSURE: 66 MMHG | OXYGEN SATURATION: 96 %

## 2025-08-08 DIAGNOSIS — I48.0 PAROXYSMAL ATRIAL FIBRILLATION: ICD-10-CM

## 2025-08-08 DIAGNOSIS — I50.810 RIGHT HEART FAILURE, UNSPECIFIED: ICD-10-CM

## 2025-08-08 DIAGNOSIS — I07.1 RHEUMATIC TRICUSPID INSUFFICIENCY: ICD-10-CM

## 2025-08-08 DIAGNOSIS — G47.00 INSOMNIA, UNSPECIFIED: ICD-10-CM

## 2025-08-08 PROCEDURE — 99214 OFFICE O/P EST MOD 30 MIN: CPT

## 2025-08-08 RX ORDER — ZOLPIDEM TARTRATE 5 MG/1
5 TABLET ORAL
Qty: 10 | Refills: 0 | Status: ACTIVE | COMMUNITY
Start: 2025-08-08 | End: 1900-01-01